# Patient Record
Sex: FEMALE | Race: ASIAN | Employment: UNEMPLOYED | URBAN - METROPOLITAN AREA
[De-identification: names, ages, dates, MRNs, and addresses within clinical notes are randomized per-mention and may not be internally consistent; named-entity substitution may affect disease eponyms.]

---

## 2017-07-12 ENCOUNTER — OFFICE VISIT (OUTPATIENT)
Dept: SURGERY | Age: 32
End: 2017-07-12

## 2017-07-12 VITALS
SYSTOLIC BLOOD PRESSURE: 118 MMHG | HEIGHT: 63 IN | DIASTOLIC BLOOD PRESSURE: 60 MMHG | BODY MASS INDEX: 19.49 KG/M2 | WEIGHT: 110 LBS

## 2017-07-12 DIAGNOSIS — R10.84 GENERALIZED ABDOMINAL PAIN: Primary | ICD-10-CM

## 2017-07-12 PROCEDURE — 99214 OFFICE O/P EST MOD 30 MIN: CPT | Performed by: SURGERY

## 2017-07-12 RX ORDER — NAPROXEN 250 MG/1
250 TABLET ORAL 2 TIMES DAILY WITH MEALS
COMMUNITY
End: 2017-09-23

## 2017-07-18 ENCOUNTER — HOSPITAL ENCOUNTER (OUTPATIENT)
Dept: CT IMAGING | Facility: MEDICAL CENTER | Age: 32
Discharge: HOME | End: 2017-07-18
Attending: SURGERY | Admitting: SURGERY

## 2017-07-18 ENCOUNTER — TELEPHONE (OUTPATIENT)
Dept: SURGERY | Age: 32
End: 2017-07-18

## 2017-07-18 DIAGNOSIS — R10.84 GENERALIZED ABDOMINAL PAIN: ICD-10-CM

## 2017-07-18 DIAGNOSIS — R10.9 RIGHT SIDED ABDOMINAL PAIN: Primary | ICD-10-CM

## 2017-07-27 ENCOUNTER — TELEPHONE (OUTPATIENT)
Dept: SURGERY | Age: 32
End: 2017-07-27

## 2017-08-11 ENCOUNTER — HOSPITAL ENCOUNTER (OUTPATIENT)
Dept: ULTRASOUND IMAGING | Age: 32
Discharge: HOME | End: 2017-08-11
Attending: SURGERY | Admitting: SURGERY

## 2017-08-11 DIAGNOSIS — R10.9 RIGHT SIDED ABDOMINAL PAIN: ICD-10-CM

## 2017-08-15 ENCOUNTER — TELEPHONE (OUTPATIENT)
Dept: SURGERY | Age: 32
End: 2017-08-15

## 2017-08-15 DIAGNOSIS — R10.11 RUQ ABDOMINAL PAIN: Primary | ICD-10-CM

## 2017-09-24 PROBLEM — R63.0 DECREASED APPETITE: Status: ACTIVE | Noted: 2017-09-24

## 2017-09-24 PROBLEM — R10.84 GENERALIZED ABDOMINAL PAIN: Status: ACTIVE | Noted: 2017-09-24

## 2017-09-24 PROBLEM — D64.9 CHRONIC ANEMIA: Chronic | Status: ACTIVE | Noted: 2017-09-24

## 2017-09-24 PROBLEM — N39.0 UTI (URINARY TRACT INFECTION): Status: ACTIVE | Noted: 2017-09-24

## 2017-09-24 PROBLEM — D50.9 MICROCYTIC ANEMIA: Status: ACTIVE | Noted: 2017-09-24

## 2017-09-24 PROBLEM — K22.89 ESOPHAGEAL DILATATION: Chronic | Status: ACTIVE | Noted: 2017-09-24

## 2017-09-24 PROBLEM — R63.6 UNDERWEIGHT: Status: ACTIVE | Noted: 2017-09-24

## 2017-09-24 PROBLEM — E87.1 HYPONATREMIA: Status: ACTIVE | Noted: 2017-09-24

## 2017-09-24 PROBLEM — Z65.8 PSYCHOSOCIAL STRESSORS: Status: ACTIVE | Noted: 2017-09-24

## 2017-09-24 PROBLEM — S36.508A: Chronic | Status: ACTIVE | Noted: 2017-09-24

## 2017-09-25 ENCOUNTER — CASE MANAGEMENT (OUTPATIENT)
Dept: EMERGENCY DEPT | Age: 32
End: 2017-09-25

## 2018-04-12 PROBLEM — N39.0 UTI (URINARY TRACT INFECTION): Status: RESOLVED | Noted: 2017-09-24 | Resolved: 2018-04-12

## 2019-01-25 ENCOUNTER — HOSPITAL ENCOUNTER (OUTPATIENT)
Age: 34
Setting detail: INFUSION SERIES
Discharge: HOME | End: 2019-01-25
Payer: COMMERCIAL

## 2019-01-25 LAB
ABO/RH: NORMAL
ANTIBODY SCREEN: NORMAL

## 2019-01-25 PROCEDURE — 86900 BLOOD TYPING SEROLOGIC ABO: CPT

## 2019-01-25 PROCEDURE — 86850 RBC ANTIBODY SCREEN: CPT

## 2019-01-25 PROCEDURE — 86923 COMPATIBILITY TEST ELECTRIC: CPT

## 2019-01-25 PROCEDURE — 86901 BLOOD TYPING SEROLOGIC RH(D): CPT

## 2019-01-28 ENCOUNTER — HOSPITAL ENCOUNTER (OUTPATIENT)
Dept: NURSING | Age: 34
Setting detail: INFUSION SERIES
Discharge: HOME | End: 2019-01-28
Payer: COMMERCIAL

## 2019-01-28 VITALS
TEMPERATURE: 98.5 F | RESPIRATION RATE: 16 BRPM | SYSTOLIC BLOOD PRESSURE: 99 MMHG | DIASTOLIC BLOOD PRESSURE: 65 MMHG | BODY MASS INDEX: 18.78 KG/M2 | HEIGHT: 63 IN | HEART RATE: 83 BPM | WEIGHT: 106 LBS

## 2019-01-28 LAB
BLOOD BANK DISPENSE STATUS: NORMAL
BLOOD BANK DISPENSE STATUS: NORMAL
BLOOD BANK PRODUCT CODE: NORMAL
BLOOD BANK PRODUCT CODE: NORMAL
BPU ID: NORMAL
BPU ID: NORMAL
DESCRIPTION BLOOD BANK: NORMAL
DESCRIPTION BLOOD BANK: NORMAL

## 2019-01-28 PROCEDURE — P9016 RBC LEUKOCYTES REDUCED: HCPCS

## 2019-01-28 PROCEDURE — 36430 TRANSFUSION BLD/BLD COMPNT: CPT

## 2019-01-28 PROCEDURE — 2580000003 HC RX 258: Performed by: INTERNAL MEDICINE

## 2019-01-28 RX ORDER — 0.9 % SODIUM CHLORIDE 0.9 %
250 INTRAVENOUS SOLUTION INTRAVENOUS ONCE
Status: COMPLETED | OUTPATIENT
Start: 2019-01-28 | End: 2019-01-28

## 2019-01-28 RX ORDER — SODIUM CHLORIDE 0.9 % (FLUSH) 0.9 %
10 SYRINGE (ML) INJECTION PRN
Status: DISCONTINUED | OUTPATIENT
Start: 2019-01-28 | End: 2019-01-29 | Stop reason: HOSPADM

## 2019-01-28 RX ORDER — ACETAMINOPHEN 325 MG/1
650 TABLET ORAL ONCE
Status: DISCONTINUED | OUTPATIENT
Start: 2019-01-28 | End: 2019-01-29 | Stop reason: HOSPADM

## 2019-01-28 RX ORDER — DIPHENHYDRAMINE HCL 25 MG
25 TABLET ORAL ONCE
Status: DISCONTINUED | OUTPATIENT
Start: 2019-01-28 | End: 2019-01-29 | Stop reason: HOSPADM

## 2019-01-28 RX ADMIN — SODIUM CHLORIDE 250 ML: 9 INJECTION, SOLUTION INTRAVENOUS at 07:36

## 2019-01-28 RX ADMIN — Medication 10 ML: at 07:32

## 2019-01-28 NOTE — PROGRESS NOTES
1st unit of PRBC's infused without any signs of adverse reactions  Pt zackary well  2nd unit of PRBC's started  IV site unremarkable  Pt without c/o's  Will stay with pt for 15 mins and monitor  Mother remains at side

## 2019-01-28 NOTE — PROGRESS NOTES
No changes noted  Pt without c/o's  Vitals stable  Blood continues to infuse without any signs of adverse reactions  Will continue to monitor  Mother remains at side

## 2019-01-28 NOTE — PROGRESS NOTES
2nd unit of PRBC's infused without any signs of adverse reactions  NS infusing to clear tubing  Pt zackary well  No c/o's voiced  Will continue to monitor

## 2019-01-28 NOTE — PROGRESS NOTES
Blood tubing clear  IV was removed  Cath tip intact  Pressure then pressure dressing applied and was secured with a coban dressing  IV site unremarkable  Pt zackary well  Discharge instructions were reviewed with pt and her mother  Copy given and understanding was verbalized  Pt discharged ambulatory in stable condiition with her mother

## 2019-01-28 NOTE — PROGRESS NOTES
Pt without c/o's Blood infusing well  No signs of adverse reactions  IV site unremarkable  Mother remains at side  Will continue to monitor

## 2019-01-28 NOTE — PROGRESS NOTES
Blood continues to infuse without any signs of adverse reactions  Pt without c/o's  Vitals stable  IV site unremarkable  Mother remains at side  Will continue to montior

## 2019-01-28 NOTE — PROGRESS NOTES
Pt arrived to unit ambulatory for 2 units of PRBC's  Current H&H is 5.1 & 18.7  Pt reports that she has had blood before without any side effects  Pt without c/o's except for being tired  Blood transfusion process explained to pt and her mother and questions were answered to their satisfaction  Blood consent obtained  IV # 20 started on 1st attempt to RAC per Valente Herron RN after 1 failed attempt per myself  Pt zackary well  Pt refused pre meds of tylenol 650 mg and benadryl 25 mg orally at present time  1st unit of PRBC's started  Will stay with pt and monitor for 15 mins  Mother remains at side